# Patient Record
Sex: FEMALE | Race: WHITE | URBAN - METROPOLITAN AREA
[De-identification: names, ages, dates, MRNs, and addresses within clinical notes are randomized per-mention and may not be internally consistent; named-entity substitution may affect disease eponyms.]

---

## 2021-02-18 ENCOUNTER — OFFICE VISIT (OUTPATIENT)
Dept: URBAN - METROPOLITAN AREA CLINIC 91 | Facility: CLINIC | Age: 71
End: 2021-02-18
Payer: COMMERCIAL

## 2021-02-18 DIAGNOSIS — H25.11 AGE-RELATED NUCLEAR CATARACT, RIGHT EYE: ICD-10-CM

## 2021-02-18 DIAGNOSIS — H02.834 DERMATOCHALASIS OF LEFT UPPER EYELID: ICD-10-CM

## 2021-02-18 DIAGNOSIS — H02.831 DERMATOCHALASIS OF RIGHT UPPER EYELID: ICD-10-CM

## 2021-02-18 PROCEDURE — 99214 OFFICE O/P EST MOD 30 MIN: CPT | Performed by: SPECIALIST

## 2021-02-18 PROCEDURE — 92134 CPTRZ OPH DX IMG PST SGM RTA: CPT | Performed by: SPECIALIST

## 2021-02-18 ASSESSMENT — INTRAOCULAR PRESSURE
OD: 11
OS: 10

## 2021-02-18 ASSESSMENT — VISUAL ACUITY
OS: 20/25
OD: 20/20

## 2022-03-07 ENCOUNTER — OFFICE VISIT (OUTPATIENT)
Dept: URBAN - METROPOLITAN AREA CLINIC 91 | Facility: CLINIC | Age: 72
End: 2022-03-07
Payer: COMMERCIAL

## 2022-03-07 DIAGNOSIS — H26.492 OTHER SECONDARY CATARACT, LEFT EYE: ICD-10-CM

## 2022-03-07 DIAGNOSIS — H35.363 DRUSEN (DEGENERATIVE) OF MACULA, BILATERAL: Primary | ICD-10-CM

## 2022-03-07 PROCEDURE — 99213 OFFICE O/P EST LOW 20 MIN: CPT | Performed by: SPECIALIST

## 2022-03-07 ASSESSMENT — VISUAL ACUITY
OS: 20/20
OD: 20/25

## 2022-03-07 ASSESSMENT — INTRAOCULAR PRESSURE
OD: 9
OS: 8

## 2022-03-07 NOTE — IMPRESSION/PLAN
Impression: Drusen (degenerative) of macula, bilateral: H35.363. Plan: For drusen I have recommended patient to call our office immediately if change is noted. I also explained the AREDS vitamin study, and advised patient that it would be beneficial for them to take. I stressed the importance of compliance and regular follow-up appointments. FFm obtained and reviewed WNL.

## 2022-03-07 NOTE — IMPRESSION/PLAN
Impression: Age-related nuclear cataract, right eye: H25.11. Plan: Due to the fact that cataracts are interfering with patient's daily activities, cataract surgery was discussed as an option to improve patient's vision. I have discussed all risks and benefits associated with surgery. Patient understands that the need for cataract surgery is NOT urgent, and that surgery may be delayed if they wish. I discussed the different intra-ocular lens options available including standard monofocal IOL, Crystalens, Toric, ReSTOR Multifocal and Nanoflex blended vision. I have explained the option of patient proceeding with standard cataract surgery vs LenSx and astigmatism management. I stressed possible side effects such as halos and glare, need for glasses, contacts and further surgery such as laser vision correction or IOL exchange. I answered all patient's questions, and addressed any concerns patient may have. Patient wishes to schedule appointment for pre-operative exam at this time. Patient would like to proceed with CE IOL OD, same as OD Symphony.

## 2022-06-07 ENCOUNTER — PRE-OPERATIVE VISIT (OUTPATIENT)
Dept: URBAN - METROPOLITAN AREA CLINIC 91 | Facility: CLINIC | Age: 72
End: 2022-06-07
Payer: COMMERCIAL

## 2022-06-07 DIAGNOSIS — H25.11 AGE-RELATED NUCLEAR CATARACT, RIGHT EYE: Primary | ICD-10-CM

## 2022-06-07 RX ORDER — OFLOXACIN 3 MG/ML
0.3 % SOLUTION/ DROPS OPHTHALMIC
Qty: 5 | Refills: 3 | Status: ACTIVE
Start: 2022-06-07

## 2022-06-07 RX ORDER — PREDNISOLONE ACETATE 10 MG/ML
1 % SUSPENSION/ DROPS OPHTHALMIC
Qty: 5 | Refills: 3 | Status: ACTIVE
Start: 2022-06-07

## 2022-06-07 RX ORDER — KETOROLAC TROMETHAMINE 5 MG/ML
0.5 % SOLUTION OPHTHALMIC
Qty: 5 | Refills: 3 | Status: ACTIVE
Start: 2022-06-07

## 2022-06-07 ASSESSMENT — PACHYMETRY
OD: 23.30
OS: 23.11
OD: 2.95

## 2022-06-16 ENCOUNTER — POST-OPERATIVE VISIT (OUTPATIENT)
Dept: URBAN - METROPOLITAN AREA CLINIC 91 | Facility: CLINIC | Age: 72
End: 2022-06-16
Payer: MEDICARE

## 2022-06-16 DIAGNOSIS — Z96.1 PRESENCE OF INTRAOCULAR LENS: Primary | ICD-10-CM

## 2022-06-16 ASSESSMENT — INTRAOCULAR PRESSURE
OS: 12
OD: 13

## 2022-06-16 NOTE — IMPRESSION/PLAN
Impression: S/P CE IOL w/Symphony LenSx OD - 1 Day. Presence of intraocular lens  Z96.1. Excellent post op course   Post operative instructions reviewed - Condition is improving - Cataract OD. Plan: Patient is healing well s/p CE IOL. Patient instructed to avoid lifting over 10 pounds and avoid swimming for 2 weeks. Instructed to continue antibiotic, NSAID and steroidal drops QID. Patient will return in 1 week for post op exam. Instructed to call our office if any pain, decreased vision or increased redness.

## 2022-06-23 ENCOUNTER — OFFICE VISIT (OUTPATIENT)
Dept: URBAN - METROPOLITAN AREA CLINIC 91 | Facility: CLINIC | Age: 72
End: 2022-06-23
Payer: MEDICARE

## 2022-06-23 DIAGNOSIS — Z96.1 PRESENCE OF INTRAOCULAR LENS: Primary | ICD-10-CM

## 2022-06-23 PROCEDURE — 99024 POSTOP FOLLOW-UP VISIT: CPT | Performed by: SPECIALIST

## 2022-06-23 ASSESSMENT — INTRAOCULAR PRESSURE
OS: 12
OD: 13

## 2022-06-23 NOTE — IMPRESSION/PLAN
Impression: Presence of intraocular lens: Z96.1 OD. Plan: Patient is healing well status post CE IOL . It is ok for patient to discontinue wearing protective shield at bedtime, and may also resume normal activity at this time. I discussed post-op medication schedule with patient. Patient should continue Keto gtts BID and Pred gtts 2 times a day x3 weeks and then discontinue.

## 2022-07-28 ENCOUNTER — OFFICE VISIT (OUTPATIENT)
Dept: URBAN - METROPOLITAN AREA CLINIC 91 | Facility: CLINIC | Age: 72
End: 2022-07-28
Payer: MEDICARE

## 2022-07-28 DIAGNOSIS — H26.492 OTHER SECONDARY CATARACT, LEFT EYE: Primary | ICD-10-CM

## 2022-07-28 PROCEDURE — 99024 POSTOP FOLLOW-UP VISIT: CPT | Performed by: SPECIALIST

## 2022-07-28 ASSESSMENT — VISUAL ACUITY
OS: 20/20
OD: 20/20

## 2022-07-28 ASSESSMENT — INTRAOCULAR PRESSURE: OD: 10

## 2023-10-02 ENCOUNTER — OFFICE VISIT (OUTPATIENT)
Dept: URBAN - METROPOLITAN AREA CLINIC 91 | Facility: CLINIC | Age: 73
End: 2023-10-02
Payer: MEDICARE

## 2023-10-02 DIAGNOSIS — H35.363 DRUSEN (DEGENERATIVE) OF MACULA, BILATERAL: Primary | ICD-10-CM

## 2023-10-02 DIAGNOSIS — H26.492 OTHER SECONDARY CATARACT, LEFT EYE: ICD-10-CM

## 2023-10-02 PROCEDURE — 99214 OFFICE O/P EST MOD 30 MIN: CPT | Performed by: SPECIALIST

## 2023-10-02 PROCEDURE — 92134 CPTRZ OPH DX IMG PST SGM RTA: CPT | Performed by: SPECIALIST

## 2023-10-02 ASSESSMENT — VISUAL ACUITY: OS: 20/25

## 2023-10-02 ASSESSMENT — INTRAOCULAR PRESSURE
OS: 13
OD: 13

## 2024-10-07 ENCOUNTER — OFFICE VISIT (OUTPATIENT)
Dept: URBAN - METROPOLITAN AREA CLINIC 91 | Facility: CLINIC | Age: 74
End: 2024-10-07
Payer: MEDICARE

## 2024-10-07 DIAGNOSIS — H35.363 DRUSEN (DEGENERATIVE) OF MACULA, BILATERAL: ICD-10-CM

## 2024-10-07 DIAGNOSIS — H26.492 OTHER SECONDARY CATARACT, LEFT EYE: Primary | ICD-10-CM

## 2024-10-07 PROCEDURE — 99213 OFFICE O/P EST LOW 20 MIN: CPT | Performed by: SPECIALIST

## 2024-10-07 ASSESSMENT — VISUAL ACUITY
OD: 20/20
OS: 20/20

## 2024-10-07 ASSESSMENT — INTRAOCULAR PRESSURE
OS: 13
OD: 13